# Patient Record
Sex: MALE | Race: BLACK OR AFRICAN AMERICAN | NOT HISPANIC OR LATINO | Employment: OTHER | URBAN - METROPOLITAN AREA
[De-identification: names, ages, dates, MRNs, and addresses within clinical notes are randomized per-mention and may not be internally consistent; named-entity substitution may affect disease eponyms.]

---

## 2022-11-21 ENCOUNTER — HOSPITAL ENCOUNTER (INPATIENT)
Facility: HOSPITAL | Age: 77
LOS: 1 days | Discharge: HOME | DRG: 125 | End: 2022-11-21
Attending: EMERGENCY MEDICINE | Admitting: INTERNAL MEDICINE
Payer: OTHER GOVERNMENT

## 2022-11-21 VITALS
HEART RATE: 77 BPM | OXYGEN SATURATION: 100 % | SYSTOLIC BLOOD PRESSURE: 144 MMHG | TEMPERATURE: 98 F | BODY MASS INDEX: 24.44 KG/M2 | WEIGHT: 165 LBS | DIASTOLIC BLOOD PRESSURE: 84 MMHG | HEIGHT: 69 IN | RESPIRATION RATE: 20 BRPM

## 2022-11-21 DIAGNOSIS — H54.62 VISION LOSS OF LEFT EYE: Primary | ICD-10-CM

## 2022-11-21 DIAGNOSIS — R07.9 CHEST PAIN: ICD-10-CM

## 2022-11-21 LAB
ALBUMIN SERPL-MCNC: 4.6 GM/DL (ref 3.4–4.8)
ALBUMIN/GLOB SERPL: 1.6 RATIO (ref 1.1–2)
ALP SERPL-CCNC: 92 UNIT/L (ref 40–150)
ALT SERPL-CCNC: 23 UNIT/L (ref 0–55)
APTT PPP: 36.2 SECONDS (ref 23.2–33.7)
AST SERPL-CCNC: 16 UNIT/L (ref 5–34)
BASOPHILS # BLD AUTO: 0.09 X10(3)/MCL (ref 0–0.2)
BASOPHILS NFR BLD AUTO: 1.1 %
BILIRUBIN DIRECT+TOT PNL SERPL-MCNC: 0.6 MG/DL
BUN SERPL-MCNC: 8.7 MG/DL (ref 8.4–25.7)
CALCIUM SERPL-MCNC: 9.7 MG/DL (ref 8.8–10)
CHLORIDE SERPL-SCNC: 106 MMOL/L (ref 98–107)
CO2 SERPL-SCNC: 24 MMOL/L (ref 23–31)
CREAT SERPL-MCNC: 0.88 MG/DL (ref 0.73–1.18)
EOSINOPHIL # BLD AUTO: 0.1 X10(3)/MCL (ref 0–0.9)
EOSINOPHIL NFR BLD AUTO: 1.2 %
ERYTHROCYTE [DISTWIDTH] IN BLOOD BY AUTOMATED COUNT: 14.3 % (ref 11.5–17)
GFR SERPLBLD CREATININE-BSD FMLA CKD-EPI: >60 MLS/MIN/1.73/M2
GLOBULIN SER-MCNC: 2.9 GM/DL (ref 2.4–3.5)
GLUCOSE SERPL-MCNC: 98 MG/DL (ref 82–115)
HCT VFR BLD AUTO: 37 % (ref 42–52)
HGB BLD-MCNC: 12.4 GM/DL (ref 14–18)
IMM GRANULOCYTES # BLD AUTO: 0.05 X10(3)/MCL (ref 0–0.04)
IMM GRANULOCYTES NFR BLD AUTO: 0.6 %
INR BLD: 1 (ref 0–1.3)
LYMPHOCYTES # BLD AUTO: 1.93 X10(3)/MCL (ref 0.6–4.6)
LYMPHOCYTES NFR BLD AUTO: 23.2 %
MCH RBC QN AUTO: 28.2 PG (ref 27–31)
MCHC RBC AUTO-ENTMCNC: 33.5 MG/DL (ref 33–36)
MCV RBC AUTO: 84.1 FL (ref 80–94)
MONOCYTES # BLD AUTO: 0.7 X10(3)/MCL (ref 0.1–1.3)
MONOCYTES NFR BLD AUTO: 8.4 %
NEUTROPHILS # BLD AUTO: 5.5 X10(3)/MCL (ref 2.1–9.2)
NEUTROPHILS NFR BLD AUTO: 65.5 %
NRBC BLD AUTO-RTO: 0 %
PLATELET # BLD AUTO: 250 X10(3)/MCL (ref 130–400)
PMV BLD AUTO: 10.7 FL (ref 7.4–10.4)
POCT GLUCOSE: 119 MG/DL (ref 70–110)
POTASSIUM SERPL-SCNC: 3.9 MMOL/L (ref 3.5–5.1)
PROT SERPL-MCNC: 7.5 GM/DL (ref 5.8–7.6)
PROTHROMBIN TIME: 13.1 SECONDS (ref 12.5–14.5)
RBC # BLD AUTO: 4.4 X10(6)/MCL (ref 4.7–6.1)
SODIUM SERPL-SCNC: 141 MMOL/L (ref 136–145)
WBC # SPEC AUTO: 8.3 X10(3)/MCL (ref 4.5–11.5)

## 2022-11-21 PROCEDURE — 99285 EMERGENCY DEPT VISIT HI MDM: CPT | Mod: 25

## 2022-11-21 PROCEDURE — 80053 COMPREHEN METABOLIC PANEL: CPT | Performed by: EMERGENCY MEDICINE

## 2022-11-21 PROCEDURE — 85025 COMPLETE CBC W/AUTO DIFF WBC: CPT | Performed by: EMERGENCY MEDICINE

## 2022-11-21 PROCEDURE — A9579 GAD-BASE MR CONTRAST NOS,1ML: HCPCS | Performed by: INTERNAL MEDICINE

## 2022-11-21 PROCEDURE — 85610 PROTHROMBIN TIME: CPT | Performed by: EMERGENCY MEDICINE

## 2022-11-21 PROCEDURE — 25000003 PHARM REV CODE 250: Performed by: EMERGENCY MEDICINE

## 2022-11-21 PROCEDURE — 25000003 PHARM REV CODE 250: Performed by: OPHTHALMOLOGY

## 2022-11-21 PROCEDURE — 85730 THROMBOPLASTIN TIME PARTIAL: CPT | Performed by: EMERGENCY MEDICINE

## 2022-11-21 PROCEDURE — 11000001 HC ACUTE MED/SURG PRIVATE ROOM

## 2022-11-21 PROCEDURE — 25500020 PHARM REV CODE 255: Performed by: INTERNAL MEDICINE

## 2022-11-21 RX ORDER — INSULIN ASPART 100 [IU]/ML
0-5 INJECTION, SOLUTION INTRAVENOUS; SUBCUTANEOUS
Status: DISCONTINUED | OUTPATIENT
Start: 2022-11-21 | End: 2022-11-21 | Stop reason: HOSPADM

## 2022-11-21 RX ORDER — PROPARACAINE HYDROCHLORIDE 5 MG/ML
1 SOLUTION/ DROPS OPHTHALMIC
Status: COMPLETED | OUTPATIENT
Start: 2022-11-21 | End: 2022-11-21

## 2022-11-21 RX ORDER — IBUPROFEN 200 MG
16 TABLET ORAL
Status: DISCONTINUED | OUTPATIENT
Start: 2022-11-21 | End: 2022-11-21 | Stop reason: HOSPADM

## 2022-11-21 RX ORDER — ONDANSETRON 2 MG/ML
4 INJECTION INTRAMUSCULAR; INTRAVENOUS EVERY 6 HOURS PRN
Status: DISCONTINUED | OUTPATIENT
Start: 2022-11-21 | End: 2022-11-21 | Stop reason: HOSPADM

## 2022-11-21 RX ORDER — TOBRAMYCIN AND DEXAMETHASONE 3; 1 MG/ML; MG/ML
1 SUSPENSION/ DROPS OPHTHALMIC 4 TIMES DAILY
Qty: 1 EACH | Refills: 0 | Status: SHIPPED | OUTPATIENT
Start: 2022-11-21 | End: 2022-11-28

## 2022-11-21 RX ORDER — GLUCAGON 1 MG
1 KIT INJECTION
Status: DISCONTINUED | OUTPATIENT
Start: 2022-11-21 | End: 2022-11-21 | Stop reason: HOSPADM

## 2022-11-21 RX ORDER — HYDROCODONE BITARTRATE AND ACETAMINOPHEN 5; 325 MG/1; MG/1
1 TABLET ORAL EVERY 6 HOURS PRN
Status: DISCONTINUED | OUTPATIENT
Start: 2022-11-21 | End: 2022-11-21 | Stop reason: HOSPADM

## 2022-11-21 RX ORDER — ACETAMINOPHEN 325 MG/1
650 TABLET ORAL EVERY 4 HOURS PRN
Status: DISCONTINUED | OUTPATIENT
Start: 2022-11-21 | End: 2022-11-21 | Stop reason: HOSPADM

## 2022-11-21 RX ORDER — SODIUM CHLORIDE 0.9 % (FLUSH) 0.9 %
10 SYRINGE (ML) INJECTION EVERY 12 HOURS PRN
Status: DISCONTINUED | OUTPATIENT
Start: 2022-11-21 | End: 2022-11-21 | Stop reason: HOSPADM

## 2022-11-21 RX ORDER — IBUPROFEN 200 MG
24 TABLET ORAL
Status: DISCONTINUED | OUTPATIENT
Start: 2022-11-21 | End: 2022-11-21 | Stop reason: HOSPADM

## 2022-11-21 RX ORDER — NALOXONE HCL 0.4 MG/ML
0.02 VIAL (ML) INJECTION
Status: DISCONTINUED | OUTPATIENT
Start: 2022-11-21 | End: 2022-11-21 | Stop reason: HOSPADM

## 2022-11-21 RX ORDER — ASPIRIN 325 MG
325 TABLET, DELAYED RELEASE (ENTERIC COATED) ORAL
Status: COMPLETED | OUTPATIENT
Start: 2022-11-21 | End: 2022-11-21

## 2022-11-21 RX ORDER — TOBRAMYCIN AND DEXAMETHASONE 3; 1 MG/ML; MG/ML
1 SUSPENSION/ DROPS OPHTHALMIC 4 TIMES DAILY
Status: DISCONTINUED | OUTPATIENT
Start: 2022-11-21 | End: 2022-11-21 | Stop reason: HOSPADM

## 2022-11-21 RX ADMIN — ASPIRIN 325 MG: 325 TABLET, COATED ORAL at 04:11

## 2022-11-21 RX ADMIN — TOBRAMYCIN AND DEXAMETHASONE 1 DROP: 3; 1 SUSPENSION/ DROPS OPHTHALMIC at 01:11

## 2022-11-21 RX ADMIN — TOBRAMYCIN AND DEXAMETHASONE 1 DROP: 3; 1 SUSPENSION/ DROPS OPHTHALMIC at 09:11

## 2022-11-21 RX ADMIN — GADOPENTETATE DIMEGLUMINE 15 ML: 469.01 INJECTION INTRAVENOUS at 03:11

## 2022-11-21 RX ADMIN — PROPARACAINE HYDROCHLORIDE 1 DROP: 5 SOLUTION/ DROPS OPHTHALMIC at 02:11

## 2022-11-21 NOTE — H&P
[facility]  Hospital Medicine History & Physical Examination       Patient Name: Alex Chisholm  MRN: [id number]  Patient Class: IP- Inpatient   Admission Date: 11/21/2022   Admitting Physician: LAISHA Service   Length of Stay: 0  Attending Physician:   Primary Care Provider: Primary Doctor No  Face-to-Face encounter date: 11/21/2022  Code Status: Full code  Chief Complaint: Loss of Vision (Pt arrives via [facility], EMS transferred pt from [place], for vision loss in the right eye, increased occular pressure. Pt reports vision is blurred. Pt recently had cataract sx on the left eye. Pt is AAOx 4. No other deficits. )        Patient information was obtained from patient, patient's family, past medical records and ER records.     HISTORY OF PRESENT ILLNESS:   Alex Chisholm is a 77 y.o. male who Past medical history includes osteoarthritis, HTN, HLD, asthma, CHF, DM type 2,  HLD; presented to the ED via EMS sent from [facility] secondary to loss of vision in his left eye with increased pressures of both eyes.  Patient denies any injury, trauma, falls, cough, congestion, chest pain or any shortness of breath.  He denies any fever, chills, nausea, vomiting, diarrhea or any recent illnesses or sick contacts.  Patient reports he was sitting at home watching TV when he had acute onset of loss of vision in his right eye.  He denies any new foods or medications.  He denies any aura, headache, or any symptoms prior to the loss of vision.  He presented to the [facility] ED which no ophthalmology services were available and no Neurology Services were available thus he was transferred from [facility] facility to the ED at [facility] for  higher level of care and further evaluation. Pt is admitted to hospital medicine services for further management.     PAST MEDICAL HISTORY:    HTN  HLD  DM type 2   CHF   Asthma   HLD   Chronic arthritis   Vitamin-D deficiency  Vitamin-D deficiency   PTSD   Tobacco  abuse    PAST SURGICAL HISTORY:    Cholecystectomy   Endoscopy   Left leg surgery   Left cataract extraction with lens implant    ALLERGIES:   Patient has no known allergies.    FAMILY HISTORY:   Reviewed and negative    SOCIAL HISTORY:   Denies any alcohol use -quit drinking several years ago   Denies any illicit drug use   Smokes cigarettes-about 1/2 pack of cigarettes a day   Lives with significant other     HOME MEDICATIONS:   Albuterol inhalers   Amlodipine 10 mg p.o. daily   Aspirin 81 mg p.o. daily   Atorvastatin 20 mg 1/2 tablet at bedtime   Calcium 600 mg /vitamin-D 400 units daily   Clonidine 0.1 mg 1 p.o. b.I.d.  Cyanocobalamin 1000 mcg 1 p.o. daily   Empagliflozin 10 mg 1 p.o. daily   Fexofenadine 180 mg 1 p.o. daily   Fluticasone nasal spray 2 sprays each nostril daily  Singulair 10 mg p.o. daily   Omeprazole 20 mg p.o. daily   Sildenafil 100 mg 1 p.o. as needed  Sulindac 200 mg  1 p.o. b.I.d.  Tramadol 50 mg 1 p.o. q.6 hours as needed for pain       REVIEW OF SYSTEMS:   Except as documented, all other systems reviewed and negative     PHYSICAL EXAM:     VITAL SIGNS: 24 HRS MIN & MAX LAST   Temp  Min: 97.5 °F (36.4 °C)  Max: 97.5 °F (36.4 °C) 97.5 °F (36.4 °C)   BP  Min: 131/85  Max: 175/90 133/83   Pulse  Min: 64  Max: 83  70   Resp  Min: 12  Max: 18 18   SpO2  Min: 95 %  Max: 99 % 97 %       General appearance: Well-developed, well-nourished male , Elderly male chronically ill-appearing; nontoxic, NAD  HENT: Atraumatic head. Moist mucous membranes of oral cavity,  poor dentition missing decayed and cracked teeth  Eyes: PERRL, christy conjunctiva  Neck: Supple.   Lungs: Clear to auscultation bilaterally. No wheezing present.   Heart: Regular rate and rhythm. S1 and S2 present with no murmurs/gallop/rub. No pedal edema. No JVD present.   Abdomen: Soft, non-distended, non-tender. No rebound tenderness/guarding. Bowel sounds are normal.   Extremities: CORRALES  Skin: warm and dry  Neuro: AAOx3, no focal  deficits; blurred vision left eye  Psych/mental status: Appropriate mood and affect. Responds appropriately to questions.     LABS AND IMAGING:     Recent Labs   Lab 11/21/22  0258   WBC 8.3   RBC 4.40*   HGB 12.4*   HCT 37.0*   MCV 84.1   MCH 28.2   MCHC 33.5   RDW 14.3      MPV 10.7*       Recent Labs   Lab 11/21/22  0258      K 3.9   CO2 24   BUN 8.7   CREATININE 0.88   CALCIUM 9.7   ALBUMIN 4.6   ALKPHOS 92   ALT 23   AST 16   BILITOT 0.6       Microbiology Results (last 7 days)       ** No results found for the last 168 hours. **             No image results found.        ASSESSMENT & PLAN:   ASSESSMENT:  Acute vision loss left eye- resolving  Hx of cataracts- s/p extraction left 4 years ago  HTN- essential  DM type II non-insulin  Chronic arthritis  Asthma- no acute exacerbation  PTSD      PLAN:   Consult Ophthalmology Services appreciate assistance and recommendations   Vision improving  MRI brain with and without contrast pending  If MRI negative able to d/c home and keep [facility] cataract extraction of right eye  Resume home medication as deemed necessary  Fall precautions    VTE Prophylaxis: SCD for DVT prophylaxis and will be advised to be as mobile as possible and sit in a chair as tolerated    Patient condition:  Stable  __________________________________________________________________________  INPATIENT LIST OF MEDICATIONS     Scheduled Meds:   tobramycin-dexAMETHasone 0.3-0.1%  1 drop Left Eye QID     Continuous Infusions:  PRN Meds:.acetaminophen, dextrose 10%, dextrose 10%, glucagon (human recombinant), glucose, glucose, HYDROcodone-acetaminophen, insulin aspart U-100, naloxone, ondansetron, sodium chloride 0.9%      ITomy FNP have reviewed and discussed the case with Dr. MACHO Ansari. Please see the following addendum for further assessment and plan from there attending MARIO Navarro    11/21/2022    ________________________________________________________________________________    MD Addendum:  I, Dr. Molina assumed care of this patient today at 12pm  For the patient encounter, I performed the substantive portion of the visit, I reviewed the NP/PA documentation, treatment plan, and medical decision making.  I had face to face time with this patient     A. History:  Patient came in with edd eye vision changes L>R. Felt like there was pressure behind his eye.   Denies any recent trauma, weakness of extremities, chest pain, fever or chills  He lives in [place], [place]     B. Physical exam:  Heart RRR  Lungs clear  Abdomen soft and non tender   NO FND     C. Medical decision making:  Patients labs and vitals reviewed  Will f/u on MI brain   His vision is now much better  Seen by Ophthalmology team and recommended out patient evaluation   Cont supportive care   Continue strict aspiration, fall and decubitus precautions      If MRI brain is negative then can dc home with Family today or in am     Discharge Planning and Disposition: No mobility needs. Ambulating well. Good social support system.   Anticipated discharge    All diagnosis and differential diagnosis have been reviewed; assessment and plan has been documented; I have personally reviewed the labs and test results that are presently available; I have reviewed the patients medication list; I have reviewed the consulting providers response and recommendations. I have reviewed or attempted to review medical records based upon their availability.    All of the patient and family questions have been addressed and answered. Patient's is agreeable to the above stated plan. I will continue to monitor closely and make adjustments to medical management as needed.

## 2022-11-21 NOTE — CONSULTS
76 yo M transferred from [facility] with loss of vision os & elevated eye pressure ou.  Pt reports developing blurry vision os while watching football game yesterday afternoon/evening.  Pt denies pain, denies total loss of vision, denies redness or irritation ou.  Denies slurred speech, weakness in arm or leg.    POH:  CEcIOL os in [place] 4 yrs ago             Saw local eye dr last week  for new glasses but put on hold until CEcIOL od  PMH: HTN            DM II, no insulin  PSH: gallbladder removed  NKDA  Meds: see MAR  SH: recently quit tob    EXAM:    [facility] using near card od-20/40 os-20/40    Ext- normal   Mot - full ou  VF- FTFC ou  Pupils - 3 to 2 1+ rx, no APD ou    Ta od-11, os-09    SLE:  L/L - normal ou  C/s - quiet ou  K- clear ou, no KP  AC - od D&Q,   os 2+ cells-pigmented, ?flare, no hyphema or hypopyon  I - no nvi ou, round od, peaked with ant syn at 5oclock  L - 2+ NSC od, PCIOL, tr PCO os    DFE os (tropicamide given):  Hazy view due to PCO  CDR  0.4,  mac normal, vit clear, vessels normal, no RH or RT seen, no obvious DR seen    A/P   Pigment dispersion vs mircrohyphema os          ?etiology? - iris chaffing? Vs other   Pt denies pain thus doubtful for endophthalmitis or uveitis      Will cover with Tobradex 1 gtt os Qid  [facility]    2.  NSC od      Scheduled for surgery                 Normal IOP ou, with no signs of glaucoma.

## 2022-11-21 NOTE — CONSULTS
78 yo M transferred from [facility] with loss of vision os & elevated eye pressure ou.  Pt reports developing blurry vision os while watching football game yesterday afternoon/evening.  Pt denies pain, denies total loss of vision, denies redness or irritation ou.  Denies slurred speech, weakness in arm or leg.    POH:  CEcIOL os in [place] 4 yrs ago             Saw local eye dr last week  for new glasses but put on hold until CEcIOL od  PMH: HTN            DM II, no insulin  PSH: gallbladder removed  NKDA  Meds: see MAR  SH: recently quit tob    EXAM:    [facility] using near card od-20/40 os-20/40    Ext- normal   Mot - full ou  VF- FTFC ou  Pupils - 3 to 2 1+ rx, no APD ou    Ta od-11, os-09    SLE:  L/L - normal ou  C/s - quiet ou  K- clear ou, no KP  AC - od D&Q,   os 2+ cells-pigmented, ?flare, no hyphema or hypopyon  I - no nvi ou, round od, peaked with ant syn at 5oclock  L - 2+ NSC od, PCIOL, tr PCO os    DFE os (tropicamide given):  Hazy view due to PCO  CDR  0.4,  mac normal, vit clear, vessels normal, no RH or RT seen, no obvious DR seen    A/P   Pigment dispersion vs mircrohyphema os          ?etiology? - iris chaffing? Vs other   Pt denies pain thus doubtful for endophthalmitis or uveitis      Will cover with Tobradex 1 gtt os Qid  [facility]    2.  NSC od      Scheduled for surgery                 Normal IOP ou, with no signs of glaucoma.

## 2022-11-21 NOTE — ED PROVIDER NOTES
Encounter Date: 11/20/2022    SCRIBE #1 NOTE: I, Esteban Madison, am scribing for, and in the presence of,  Preston Marcus MD. I have scribed the following portions of the note - Other sections scribed: HPI, ROS, PE.     History     Chief Complaint   Patient presents with    Loss of Vision     Pt arrives via [facility], EMS transferred pt from [place], for vision loss in the right eye, increased occular pressure. Pt reports vision is blurred. Pt recently had cataract sx on the left eye. Pt is AAOx 4. No other deficits.      77 year old male presents to ED as transfer from outlying facility secondary to decreased vision in his left eye onset yesterday for neurological and ophthalmologic services . Pt reports that he was watching TV and everything became blurry. Pt states that he took his eye pressure drops. Pt reports improvement in his right eye but no improvement in his left. Pt denies pain from either eye. Pt deneis totla vision loss. Pt reports taht his sight in his left eye is improving since onset. Per report from sending hospital, pt had right eye pressure of 80 and left eye pressure of 40 with a negative CT scan.Pt states that he has had a cataract removed from  his left eye but still has a cataract in his right. Pt reports that he is on blood thinners but is unsure of which one. Pt states he has past medical history of CHF    The history is provided by the patient and medical records. No  was used.   Illness   The current episode started yesterday. The problem occurs continuously. The problem has been gradually improving. The pain is at a severity of 0/10. Nothing relieves the symptoms. Nothing aggravates the symptoms. Associated symptoms include decreased vision. Pertinent negatives include no fever, no abdominal pain, no constipation, no diarrhea, no congestion, no ear pain, no headaches, no cough, no shortness of breath, no wheezing and no rash.   Review of patient's allergies  indicates:  No Known Allergies  No past medical history on file.  No past surgical history on file.  No family history on file.     Review of Systems   Constitutional:  Negative for chills and fever.   HENT:  Negative for congestion and ear pain.    Respiratory:  Negative for cough, shortness of breath and wheezing.    Cardiovascular:  Negative for chest pain and leg swelling.   Gastrointestinal:  Negative for abdominal pain, constipation and diarrhea.   Genitourinary:  Negative for dysuria, flank pain and frequency.   Musculoskeletal:  Negative for back pain and joint swelling.   Skin:  Negative for rash.   Neurological:  Negative for dizziness and headaches.   Psychiatric/Behavioral:  Negative for agitation, confusion and hallucinations.      Physical Exam     Initial Vitals [11/21/22 0140]   BP Pulse Resp Temp SpO2   (!) 160/91 69 18 97.5 °F (36.4 °C) 99 %      MAP       --         Physical Exam    Nursing note and vitals reviewed.  Constitutional: He appears well-developed and well-nourished. No distress.   HENT:   Head: Normocephalic and atraumatic.   Eyes: Conjunctivae and EOM are normal. Pupils are equal, round, and reactive to light. Right eye exhibits no discharge. Left eye exhibits no discharge. No scleral icterus.   Right eye pressure 9.2, Can read name badge with right eye from 2 feet away: left eye pressure 9.6, can count fingers with left eye from 2 feet away   Neck: No tracheal deviation present.   Cardiovascular:  Normal rate, regular rhythm, normal heart sounds and intact distal pulses.           No murmur heard.  Pulmonary/Chest: Breath sounds normal. No stridor. No respiratory distress. He has no wheezes. He has no rales.   Abdominal: Abdomen is soft. He exhibits no distension. There is no abdominal tenderness.   Musculoskeletal:         General: No tenderness or edema. Normal range of motion.     Neurological: He is alert and oriented to person, place, and time. He has normal strength and normal  reflexes. No cranial nerve deficit.   No pronator drift, 5/5 strength bilateral lower extremities   Skin: Skin is warm and dry. No rash noted. No erythema. No pallor.   Psychiatric: He has a normal mood and affect. His behavior is normal. Judgment and thought content normal.       ED Course   Procedures  Labs Reviewed   APTT - Abnormal; Notable for the following components:       Result Value    PTT 36.2 (*)     All other components within normal limits   CBC WITH DIFFERENTIAL - Abnormal; Notable for the following components:    RBC 4.40 (*)     Hgb 12.4 (*)     Hct 37.0 (*)     MPV 10.7 (*)     IG# 0.05 (*)     All other components within normal limits   PROTIME-INR - Normal   CBC W/ AUTO DIFFERENTIAL    Narrative:     The following orders were created for panel order CBC Auto Differential.  Procedure                               Abnormality         Status                     ---------                               -----------         ------                     CBC with Differential[[id number]]        Abnormal            Final result                 Please view results for these tests on the individual orders.   COMPREHENSIVE METABOLIC PANEL          Imaging Results    None          Medications   proparacaine 0.5 % ophthalmic solution 1 drop (1 drop Left Eye Given by Provider 11/21/22 0200)   aspirin EC tablet 325 mg (325 mg Oral Given 11/21/22 0430)     Medical Decision Making:   Clinical Tests:   Radiological Study: Ordered and Reviewed        Scribe Attestation:   Scribe #1: I performed the above scribed service and the documentation accurately describes the services I performed. I attest to the accuracy of the note.    Attending Attestation:           Physician Attestation for Scribe:  Physician Attestation Statement for Scribe #1: I, Preston Marcus MD, reviewed documentation, as scribed by Esteban Madison in my presence, and it is both accurate and complete.           ED Course as of 11/21/22 0599    Mon Nov 21, 2022   0428 Optho aury, also MRI has no call availability - this was not communicated to staff prior to xfer.  Will give asa and admit for mri and ophto consult in am   [NL]   0258 Dr wong pageashok, will see in consult but will likely need to be seen in office to make definitive dx. [NL]      ED Course User Index  [NL] Preston Marcus MD                 Clinical Impression:   Final diagnoses:  [H54.62] Vision loss of left eye (Primary)      ED Disposition Condition    Admit Stable                Preston Marcus MD  11/21/22 5086       Preston Marcus MD  11/21/22 0656

## 2022-11-21 NOTE — CONSULTS
78 yo M transferred from [facility] with loss of vision os & elevated eye pressure ou.  Pt reports developing blurry vision os while watching football game yesterday afternoon/evening.  Pt denies pain, denies total loss of vision, denies redness or irritation ou.    POH:  CEcIOL os in [place] 4 yrs ago             Saw local eye dr last week  for new glasses but put on hold until CEcIOL od  PMH: HTN            DM II, no insulin  PSH: gallbladder removed  NKDA  Meds: see MAR  SH: recently quit tob    EXAM:    [facility] using near card od-20/40 os-20/40    Ext- normal   Mot - full ou  VF- FTFC ou  Pupils - 3 to 2 1+ rx, no APD ou    Ta od-11, os-09    SLE:  L/L - normal ou  C/s - quiet ou  K- clear ou  AC - od D&Q,   os 2+ cells, ?flare  I - no nvi ou, round od, peaked with ant syn at 5oclock  L - 2+ NSC od, PCIOL, tr PCO os    DFE os:  CDR     A/P   Possible vitreous hemorrhage with spill over in AC, most likely related to DR     Need outpt FU with retina specialist  [facility] od     Scheduled for surgery near his home                 Normal IOP ou, with no signs of glaucoma, blatant misdiagnosis by [facility].  They should properly train their personnel on how to operate a tonopen or similar device.